# Patient Record
(demographics unavailable — no encounter records)

---

## 2025-02-26 NOTE — ASSESSMENT
[FreeTextEntry1] : 92 yr old female hx CAD ( stent x 2) MI, (UTI  in 2012 admitted for antibiotics given Penicillin had MI), CHF HTN, Diabetes Mellitus type 2 (oral meds) Aortic Stenosis   2/20 S/P TAVR, Restarted: lisinopril 2.5 mg po daily and metoprolol succinate 12.5 mg po daily. Post TAVR TTE shows LVEF 59%, A CARMINE 3 Ultra RESILIA (TAVR) valve replacement is present in the aortic position The prosthetic valve is well seated. No intravalvular regurgitation No paravalvular regurgitation.  Follows Dr. Agustin Merchant for cardiology.   Presents today with her son.  Feels about the same sp TAVR, son feels she has more energy.  Son reports he feels she is more irritable. Eating and drinking with +BM. Denies chest pain, SOB, swelling, weight gain, palpitations, cough, fever or chills.  Meds confirmed per below:   Caltrate 600 mg oral tablet: 1 tab(s) orally once a day clopidogrel 75 mg oral tablet: 1 tab(s) orally once a day Coenzyme Q10 100 mg oral capsule: 1 cap(s) orally once a day colchicine: 0.6mg cranberry oral capsule: 1 tab(s) orally once a day glimepiride 1 mg oral tablet: 1 tab(s) orally once a day lisinopril 2.5 mg oral tablet: 1 tab(s) orally prn metoprolol succinate 25 mg oral tablet, extended release: 0.5 tab(s) orally once a day Multiple Vitamins oral tablet: 1 tab(s) orally once a day Probiotic Formula (Bacillus Coagulans) oral capsule: 1 cap(s) orally once a day Repatha 140 mg/mL subcutaneous solution: 140 milligram(s) subcutaneously every 2 weeks Turmeric: 2 cap(s) orally once a day Vitamin B 1 tab po daily: Vitamin D 2000IU po daily:  Today on exam, patient's lungs are clear bilaterally, normal sinus rhythm and bilateral groins are clean, dry and intact. There is no hematoma. No peripheral edema noted on exam. EKG performed and reviewed. A&O X 3 with BLUE and BLLE with good strength. No acute neuro deficits. SBE antibiotic prophylaxis discussed at length.  Patient instructed to continue current medication regimen and followup with cardiology.  Plan:  - Follow up with cardiologist Dr. Merchant  - Follow up with cardiologist with TTE in one month - SBE antibiotic prophylaxis discussed at length - Call with any questions or concerns

## 2025-05-15 NOTE — PROCEDURE
[FreeTextEntry1] : Procedure: supartz injections b/L knee   The procedure risks, benefits and alternatives was discussed with the patient. written consent was obtained prior to the procedure and is detailed in the patient's record.  Indications: therapeutic purposes  #1 site identified in the right knee.  Anesthesia was with ethyl chloride and 1 ml of xylocaine 1% The patient was prepped with betadine solution, alcohol and chlorhexidine.  A 21 gauge 1.5 inch needle was used. 0cc of fluid was aspirated. Injectables: was injected into the site supartz.  the patient tolerated the procedure well. there were no complications. handouts/patient instructions were given to patient . patient was instructed to call if redness at site, a decrease in range of motion or an increase in pain is noted after procedure.  # 2 site identified in the left knee I aspirated0 cc and injected the left knee with supartz x 1  Patient did not want any fluid to be removed. Personal preference.